# Patient Record
Sex: MALE | Race: WHITE
[De-identification: names, ages, dates, MRNs, and addresses within clinical notes are randomized per-mention and may not be internally consistent; named-entity substitution may affect disease eponyms.]

---

## 2018-11-14 ENCOUNTER — HOSPITAL ENCOUNTER (EMERGENCY)
Dept: HOSPITAL 14 - H.ER | Age: 73
Discharge: HOME | End: 2018-11-14
Payer: MEDICARE

## 2018-11-14 VITALS
DIASTOLIC BLOOD PRESSURE: 70 MMHG | SYSTOLIC BLOOD PRESSURE: 120 MMHG | HEART RATE: 70 BPM | TEMPERATURE: 98.3 F | OXYGEN SATURATION: 98 %

## 2018-11-14 VITALS — RESPIRATION RATE: 20 BRPM

## 2018-11-14 DIAGNOSIS — R10.9: Primary | ICD-10-CM

## 2018-11-14 DIAGNOSIS — K57.30: ICD-10-CM

## 2018-11-14 LAB
ALBUMIN SERPL-MCNC: 4.4 G/DL (ref 3.5–5)
ALBUMIN/GLOB SERPL: 1.2 {RATIO} (ref 1–2.1)
ALT SERPL-CCNC: 29 U/L (ref 21–72)
AST SERPL-CCNC: 30 U/L (ref 17–59)
BASOPHILS # BLD AUTO: 0 K/UL (ref 0–0.2)
BASOPHILS NFR BLD: 0.5 % (ref 0–2)
BILIRUB UR-MCNC: NEGATIVE MG/DL
BUN SERPL-MCNC: 20 MG/DL (ref 9–20)
CALCIUM SERPL-MCNC: 9.1 MG/DL (ref 8.4–10.2)
COLOR UR: YELLOW
EOSINOPHIL # BLD AUTO: 1.7 K/UL (ref 0–0.7)
EOSINOPHIL NFR BLD: 16.1 % (ref 0–4)
ERYTHROCYTE [DISTWIDTH] IN BLOOD BY AUTOMATED COUNT: 14 % (ref 11.5–14.5)
GFR NON-AFRICAN AMERICAN: > 60
GLUCOSE UR STRIP-MCNC: (no result) MG/DL
HGB BLD-MCNC: 16.2 G/DL (ref 12–18)
LEUKOCYTE ESTERASE UR-ACNC: (no result) LEU/UL
LYMPHOCYTES # BLD AUTO: 3.7 K/UL (ref 1–4.3)
LYMPHOCYTES NFR BLD AUTO: 34.9 % (ref 20–40)
MCH RBC QN AUTO: 32.9 PG (ref 27–31)
MCHC RBC AUTO-ENTMCNC: 33 G/DL (ref 33–37)
MCV RBC AUTO: 99.7 FL (ref 80–94)
MONOCYTES # BLD: 0.8 K/UL (ref 0–0.8)
MONOCYTES NFR BLD: 7.3 % (ref 0–10)
NEUTROPHILS # BLD: 4.4 K/UL (ref 1.8–7)
NEUTROPHILS NFR BLD AUTO: 41.2 % (ref 50–75)
NRBC BLD AUTO-RTO: 0.1 % (ref 0–0)
PH UR STRIP: 5 [PH] (ref 5–8)
PLATELET # BLD: 209 K/UL (ref 130–400)
PMV BLD AUTO: 9.3 FL (ref 7.2–11.7)
PROT UR STRIP-MCNC: NEGATIVE MG/DL
RBC # BLD AUTO: 4.94 MIL/UL (ref 4.4–5.9)
RBC # UR STRIP: NEGATIVE /UL
SP GR UR STRIP: 1.02 (ref 1–1.03)
SQUAMOUS EPITHIAL: < 1 /HPF (ref 0–5)
URINE CLARITY: CLEAR
UROBILINOGEN UR-MCNC: (no result) MG/DL (ref 0.2–1)
WBC # BLD AUTO: 10.7 K/UL (ref 4.8–10.8)

## 2018-11-14 PROCEDURE — 74177 CT ABD & PELVIS W/CONTRAST: CPT

## 2018-11-14 PROCEDURE — 87086 URINE CULTURE/COLONY COUNT: CPT

## 2018-11-14 PROCEDURE — 99283 EMERGENCY DEPT VISIT LOW MDM: CPT

## 2018-11-14 PROCEDURE — 85025 COMPLETE CBC W/AUTO DIFF WBC: CPT

## 2018-11-14 PROCEDURE — 81003 URINALYSIS AUTO W/O SCOPE: CPT

## 2018-11-14 PROCEDURE — 80053 COMPREHEN METABOLIC PANEL: CPT

## 2018-11-14 NOTE — CT
Date of service: 



11/14/2018



PROCEDURE:  CT Abdomen and Pelvis with contrast



HISTORY:

abdominal pain rule out r hernia



COMPARISON:

None available.



TECHNIQUE:

CT scan of the abdomen and pelvis was performed after administration 

of intravenous contrast. Oral contrast was not administered.  Coronal 

and sagittal reformatted images were obtained.



Contrast dose: 90 mL Omnipaque 300 



Radiation dose:



Total exam DLP = 379.54 mGy-cm.



This CT exam was performed using one or more of the following dose 

reduction techniques: Automated exposure control, adjustment of the 

mA and/or kV according to patient size, and/or use of iterative 

reconstruction technique.



FINDINGS:



LOWER THORAX:

There is linear atelectasis/scarring in the visualized lungs.  There 

is subsegmental atelectasis in the right lung base. 



LIVER:

Normal in size with homogeneous enhancement.  No gross lesion or 

ductal dilatation. 



GALLBLADDER AND BILE DUCTS:

Well distended.  No calcified gallstones, wall thickening or 

pericholecystic fluid. 



PANCREAS:

Normal in size with homogeneous enhancement.  No gross lesion or 

ductal dilatation.



SPLEEN:

Normal in size and appearance. 



ADRENALS:

No discrete nodule.  Mild thickening of the left adrenal gland. 



KIDNEYS AND URETERS:

Normal in size with homogeneous enhancement.  No hydronephrosis. No 

solid mass.  There are simple cortical cysts in the left kidney, the 

largest exophytic cyst in the upper pole measures 4.1 x 2.8 cm. 



VASCULATURE:

No aortic aneurysm.  Aortic atherosclerotic calcifications are 

present. 



BOWEL:

Evaluation of the bowel is limited in the absence of oral contrast.  

There is mild dilatation of small bowel loops.  There is mild mural 

thickening in the colonic wall.  There is scattered colonic 

diverticulosis without CT evidence for acute diverticulitis. 



APPENDIX:

Normal appendix. 



PERITONEUM:

No free fluid. No free air. 



LYMPH NODES:

No enlarged lymph nodes. 



BLADDER:

Well distended and normal in appearance. 



REPRODUCTIVE:

The prostate gland is normal in size. 



BONES:

No acute fracture.



There is diffuse bone demineralization and multilevel degenerative 

changes in the spine.



OTHER FINDINGS:

There is a small sliding hiatal her.



IMPRESSION:

Mild dilatation of small bowel loops and mild mural thickening in the 

colon may represent nonspecific acute infectious/inflammatory 

enterocolitis. 



No CT evidence for inguinal or ventral wall hernia. 



Scattered colonic diverticulosis without CT evidence for acute 

diverticulitis.

## 2018-11-14 NOTE — ED PDOC
HPI: Abdomen


Time Seen by Provider: 11/14/18 16:22


Chief Complaint (Nursing): Abdominal Pain


Chief Complaint (Provider): Abdominal Pain


History Per: Patient


History/Exam Limitations: no limitations


Onset/Duration Of Symptoms: Days (x3 months)


Outside of [place] travel?: No


Current Symptoms Are (Timing): Still Present


Additional Complaint(s): 


72 y/o male presents to the ED for evaluation of pain to the right groin, onset 

3 months ago. Patient reports of having had two hernia repairs in 2008 in that 

same area. Patient describes pain as burning and has been hurting for 

approximately 3 months. Patient is requesting an XR at this time for further e

valuation. Otherwise, patient denies fever and vomiting. [employer] Faroese 

 #[id number] used to obtain history.





PMD: [facility] 





Past Medical History


Reviewed: Historical Data, Nursing Documentation, Vital Signs


Vital Signs: 





                                Last Vital Signs











Temp  97.8 F   11/14/18 16:33


 


Pulse  69   11/14/18 16:33


 


Resp  20   11/14/18 16:33


 


BP  151/89 H  11/14/18 16:33


 


Pulse Ox  97   11/14/18 16:33














- Medical History


PMH: No Chronic Diseases





- Surgical History


Surgical History: Hernia Repair (x2 in 2008)





- Family History


Family History: States: Unknown Family Hx





- Home Medications


Home Medications: 


                                Ambulatory Orders











 Medication  Instructions  Recorded


 


Naproxen/Esomeprazole Mag [Vimovo 1 each PO BID PRN #30 tab.ir. 08/16/16





 375-20 mg Tablet]  














- Allergies


Allergies/Adverse Reactions: 


                                    Allergies











Allergy/AdvReac Type Severity Reaction Status Date / Time


 


No Known Allergies Allergy   Verified 08/16/16 15:15














Review of Systems


ROS Statement: Except As Marked, All Systems Reviewed And Found Negative


Constitutional: Negative for: Fever


Gastrointestinal: Negative for: Vomiting


Genitourinary Male: Positive for: Other (Right sided groin pain)





Physical Exam





- Reviewed


Nursing Documentation Reviewed: Yes


Vital Signs Reviewed: Yes





- Physical Exam


Appears: Positive for: No Acute Distress


Head Exam: Positive for: ATRAUMATIC, NORMOCEPHALIC


Skin: Positive for: Normal Color, Warm, Dry


Eye Exam: Positive for: Normal appearance, EOMI, PERRL


Neck: Positive for: Normal, Painless ROM


Cardiovascular/Chest: Positive for: Regular Rate, Rhythm.  Negative for: Murmur


Respiratory: Positive for: Normal Breath Sounds.  Negative for: Respiratory 

Distress


Gastrointestinal/Abdominal: Positive for: Normal Exam, Soft.  Negative for: 

Tenderness


Male Genital Exam: Positive for: other (Mild right sided inguinal tenderness).  

Negative for: hernia mass (no palpable mass)


Back: Positive for: Normal Inspection.  Negative for: L CVA Tenderness, R CVA 

Tenderness, Vertebral Tenderness


Extremity: Positive for: Normal ROM.  Negative for: Deformity


Neurologic/Psych: Positive for: Alert, Oriented.  Negative for: Motor/Sensory 

Deficits





- Laboratory Results


Result Diagrams: 


                                 11/14/18 17:04





                                 11/14/18 17:04





- ECG


O2 Sat by Pulse Oximetry: 97 (RA)


Pulse Ox Interpretation: Normal





Medical Decision Making


Medical Decision Making: 


Time: 1704


Plan:


-- CT Abd/Pelvis IV Contrast Only


-- CMP


-- CBC with Differentials


-- Urine C&S


-- Urinalysis 





Time: 1821


CT RESULTS


FINDINGS:





LOWER THORAX:


There is linear atelectasis/scarring in the visualized lungs.  There is 

subsegmental atelectasis in the right lung base. 





LIVER:


Normal in size with homogeneous enhancement.  No gross lesion or ductal 

dilatation. 





GALLBLADDER AND BILE DUCTS:


Well distended.  No calcified gallstones, wall thickening or pericholecystic 

fluid. 





PANCREAS:


Normal in size with homogeneous enhancement.  No gross lesion or ductal 

dilatation.





SPLEEN:


Normal in size and appearance. 





ADRENALS:


No discrete nodule.  Mild thickening of the left adrenal gland. 





KIDNEYS AND URETERS:


Normal in size with homogeneous enhancement.  No hydronephrosis. No solid mass. 

There are simple cortical cysts in the left kidney, the largest exophytic cyst 

in the upper pole measures 4.1 x 2.8 cm. 





VASCULATURE:


No aortic aneurysm.  Aortic atherosclerotic calcifications are present. 





BOWEL:


Evaluation of the bowel is limited in the absence of oral contrast.  There is 

mild dilatation of small bowel loops.  There is mild mural thickening in the 

colonic wall.  There is scattered colonic diverticulosis without CT evidence for

acute diverticulitis. 





APPENDIX:


Normal appendix. 





PERITONEUM:


No free fluid. No free air. 





LYMPH NODES:


No enlarged lymph nodes. 





BLADDER:


Well distended and normal in appearance. 





REPRODUCTIVE:


The prostate gland is normal in size. 





BONES:


No acute fracture.





There is diffuse bone demineralization and multilevel degenerative changes in 

the spine.





OTHER FINDINGS:


There is a small sliding hiatal her.





IMPRESSION:


Mild dilatation of small bowel loops and mild mural thickening in the colon may 

represent nonspecific acute infectious/inflammatory enterocolitis. 





No CT evidence for inguinal or ventral wall hernia. 





Scattered colonic diverticulosis without CT evidence for acute diverticulitis.





Time: 1839


-- On re-evaluation patient reports symptoms have improved. On exam, abdomen is 

soft and non-tender. Patient is stable for discharge home. 





______________________________________________________________________________


Scribe Attestation:


Documented by Wily Mccarthy, acting as a scribe for Gilmer Morris MD.





Provider Scribe Attestation:


All medical record entries made by the Scribe were at my direction and 

personally dictated by me. I have reviewed the chart and agree that the record 

accurately reflects my personal performance of the history, physical exam, 

medical decision making, and the department course for this patient. I have also

personally directed, reviewed, and agree with the discharge instructions and 

disposition.





Disposition





- Clinical Impression


Clinical Impression: 


 Abdominal pain








- Disposition


Referrals: 


[facility] [Outside]


Spencer Marino MD [Staff Provider] - 


Condition: IMPROVED


Additional Instructions: 


follow up with your primary doctor in 1-2 days  (in [place]) for reevaluation

as well as GI referral given


return to the ED with any worsening or concerning symptoms


Instructions:  Stomach Ache and Stomach Upset


Forms:  [employer] (English), [email or web address] (Faroese)


Print Language: Senegalese

## 2019-01-14 ENCOUNTER — HOSPITAL ENCOUNTER (EMERGENCY)
Dept: HOSPITAL 14 - H.ER | Age: 74
Discharge: HOME | End: 2019-01-14
Payer: MEDICARE

## 2019-01-14 VITALS
OXYGEN SATURATION: 95 % | DIASTOLIC BLOOD PRESSURE: 75 MMHG | SYSTOLIC BLOOD PRESSURE: 144 MMHG | HEART RATE: 62 BPM | RESPIRATION RATE: 16 BRPM | TEMPERATURE: 98.1 F

## 2019-01-14 VITALS — BODY MASS INDEX: 25 KG/M2

## 2019-01-14 DIAGNOSIS — J18.9: Primary | ICD-10-CM

## 2019-01-14 NOTE — RAD
Date of service: 



01/14/2019



HISTORY:

Cough 



COMPARISON:

11/14/2008



TECHNIQUE:

Chest PA and lateral



FINDINGS:



LINES AND TUBES:

None. 



LUNG AND PLEURA:

The lungs are well inflated.  There is confluent airspace disease in 

the right lower lobe and linear scarring in the left lower lobe.  No 

pleural effusion or pneumothorax.



HEART AND MEDIASTINUM:

The heart is not enlarged.  No aortic atherosclerotic calcifications 

present.  The hilar and mediastinal contours are within normal limits.



SKELETAL STRUCTURES:

The bony structures are within normal limits for the patient's age.



VISUALIZED UPPER ABDOMEN:

Normal.



OTHER FINDINGS:

None.



IMPRESSION:

Confluent airspace disease in the right lower lobe may represent 

pneumonia. 



Follow-up after medical management is recommended to ensure complete 

resolution.

## 2019-05-29 ENCOUNTER — HOSPITAL ENCOUNTER (INPATIENT)
Dept: HOSPITAL 31 - C.ER | Age: 74
LOS: 1 days | Discharge: LEFT BEFORE DISCHARGE | DRG: 192 | End: 2019-05-30
Attending: INTERNAL MEDICINE | Admitting: INTERNAL MEDICINE
Payer: MEDICAID

## 2019-05-29 VITALS — RESPIRATION RATE: 20 BRPM

## 2019-05-29 VITALS — BODY MASS INDEX: 25 KG/M2

## 2019-05-29 DIAGNOSIS — J44.1: Primary | ICD-10-CM

## 2019-05-29 DIAGNOSIS — F17.210: ICD-10-CM

## 2019-05-29 LAB
ALBUMIN SERPL-MCNC: 4.1 G/DL (ref 3.5–5)
ALBUMIN/GLOB SERPL: 1.2 {RATIO} (ref 1–2.1)
ALT SERPL-CCNC: 32 U/L (ref 21–72)
AST SERPL-CCNC: 37 U/L (ref 17–59)
BACTERIA #/AREA URNS HPF: (no result) /[HPF]
BASOPHILS # BLD AUTO: 0 K/UL (ref 0–0.2)
BASOPHILS NFR BLD: 0.4 % (ref 0–2)
BILIRUB UR-MCNC: NEGATIVE MG/DL
BUN SERPL-MCNC: 15 MG/DL (ref 9–20)
CALCIUM SERPL-MCNC: 9 MG/DL (ref 8.6–10.4)
EOSINOPHIL # BLD AUTO: 1.6 K/UL (ref 0–0.7)
EOSINOPHIL NFR BLD: 15.2 % (ref 0–4)
ERYTHROCYTE [DISTWIDTH] IN BLOOD BY AUTOMATED COUNT: 14.4 % (ref 11.5–14.5)
GFR NON-AFRICAN AMERICAN: > 60
GLUCOSE UR STRIP-MCNC: NORMAL MG/DL
HGB BLD-MCNC: 15.9 G/DL (ref 12–18)
LEUKOCYTE ESTERASE UR-ACNC: (no result) LEU/UL
LYMPHOCYTES # BLD AUTO: 2.2 K/UL (ref 1–4.3)
LYMPHOCYTES NFR BLD AUTO: 20.4 % (ref 20–40)
MCH RBC QN AUTO: 33 PG (ref 27–31)
MCHC RBC AUTO-ENTMCNC: 34 G/DL (ref 33–37)
MCV RBC AUTO: 97.1 FL (ref 80–94)
MONOCYTES # BLD: 0.8 K/UL (ref 0–0.8)
MONOCYTES NFR BLD: 6.9 % (ref 0–10)
NEUTROPHILS # BLD: 6.2 K/UL (ref 1.8–7)
NEUTROPHILS NFR BLD AUTO: 57.1 % (ref 50–75)
NRBC BLD AUTO-RTO: 0 % (ref 0–2)
PH UR STRIP: 6 [PH] (ref 5–8)
PLATELET # BLD: 215 K/UL (ref 130–400)
PMV BLD AUTO: 9.8 FL (ref 7.2–11.7)
PROT UR STRIP-MCNC: NEGATIVE MG/DL
RBC # BLD AUTO: 4.82 MIL/UL (ref 4.4–5.9)
RBC # UR STRIP: NEGATIVE /UL
SP GR UR STRIP: 1.01 (ref 1–1.03)
UROBILINOGEN UR-MCNC: NORMAL MG/DL (ref 0.2–1)
WBC # BLD AUTO: 10.8 K/UL (ref 4.8–10.8)

## 2019-05-29 RX ADMIN — WATER SCH MLS/HR: 1 INJECTION INTRAMUSCULAR; INTRAVENOUS; SUBCUTANEOUS at 21:14

## 2019-05-29 RX ADMIN — IPRATROPIUM BROMIDE AND ALBUTEROL SULFATE SCH ML: .5; 3 SOLUTION RESPIRATORY (INHALATION) at 12:31

## 2019-05-29 RX ADMIN — IPRATROPIUM BROMIDE AND ALBUTEROL SULFATE SCH ML: .5; 3 SOLUTION RESPIRATORY (INHALATION) at 13:25

## 2019-05-29 RX ADMIN — BUDESONIDE SCH MG: 0.5 SUSPENSION RESPIRATORY (INHALATION) at 19:43

## 2019-05-29 RX ADMIN — ARFORMOTEROL TARTRATE SCH MCG: 15 SOLUTION RESPIRATORY (INHALATION) at 19:42

## 2019-05-29 RX ADMIN — METHYLPREDNISOLONE SODIUM SUCCINATE SCH MG: 40 INJECTION, POWDER, FOR SOLUTION INTRAMUSCULAR; INTRAVENOUS at 21:14

## 2019-05-30 VITALS
SYSTOLIC BLOOD PRESSURE: 150 MMHG | OXYGEN SATURATION: 95 % | TEMPERATURE: 98.1 F | DIASTOLIC BLOOD PRESSURE: 81 MMHG | HEART RATE: 86 BPM

## 2019-05-30 LAB
ALBUMIN SERPL-MCNC: 3.6 G/DL (ref 3.5–5)
ALBUMIN/GLOB SERPL: 1.3 {RATIO} (ref 1–2.1)
ALT SERPL-CCNC: 22 U/L (ref 21–72)
AST SERPL-CCNC: 24 U/L (ref 17–59)
BASOPHILS # BLD AUTO: 0 K/UL (ref 0–0.2)
BASOPHILS NFR BLD: 0.1 % (ref 0–2)
BUN SERPL-MCNC: 18 MG/DL (ref 9–20)
CALCIUM SERPL-MCNC: 8.6 MG/DL (ref 8.6–10.4)
EOSINOPHIL # BLD AUTO: 0 K/UL (ref 0–0.7)
EOSINOPHIL NFR BLD: 0.1 % (ref 0–4)
ERYTHROCYTE [DISTWIDTH] IN BLOOD BY AUTOMATED COUNT: 14.1 % (ref 11.5–14.5)
GFR NON-AFRICAN AMERICAN: > 60
HGB BLD-MCNC: 14 G/DL (ref 12–18)
LYMPHOCYTE: 4 % (ref 20–40)
LYMPHOCYTES # BLD AUTO: 0.8 K/UL (ref 1–4.3)
LYMPHOCYTES NFR BLD AUTO: 4.3 % (ref 20–40)
MCH RBC QN AUTO: 33.2 PG (ref 27–31)
MCHC RBC AUTO-ENTMCNC: 34.2 G/DL (ref 33–37)
MCV RBC AUTO: 97 FL (ref 80–94)
MONOCYTE: 2 % (ref 0–10)
MONOCYTES # BLD: 0.6 K/UL (ref 0–0.8)
MONOCYTES NFR BLD: 3.2 % (ref 0–10)
NEUTROPHILS # BLD: 17.1 K/UL (ref 1.8–7)
NEUTROPHILS NFR BLD AUTO: 92.3 % (ref 50–75)
NEUTROPHILS NFR BLD AUTO: 94 % (ref 50–75)
NRBC BLD AUTO-RTO: 0 % (ref 0–2)
PLATELET # BLD EST: NORMAL 10*3/UL
PLATELET # BLD: 216 K/UL (ref 130–400)
PMV BLD AUTO: 9.7 FL (ref 7.2–11.7)
RBC # BLD AUTO: 4.21 MIL/UL (ref 4.4–5.9)
RBC MORPH BLD: NORMAL
TOTAL CELLS COUNTED BLD: 100
WBC # BLD AUTO: 18.6 K/UL (ref 4.8–10.8)

## 2019-05-30 RX ADMIN — METHYLPREDNISOLONE SODIUM SUCCINATE SCH MG: 40 INJECTION, POWDER, FOR SOLUTION INTRAMUSCULAR; INTRAVENOUS at 13:04

## 2019-05-30 RX ADMIN — METHYLPREDNISOLONE SODIUM SUCCINATE SCH MG: 40 INJECTION, POWDER, FOR SOLUTION INTRAMUSCULAR; INTRAVENOUS at 05:40

## 2019-05-30 RX ADMIN — BUDESONIDE SCH MG: 0.5 SUSPENSION RESPIRATORY (INHALATION) at 08:45

## 2019-05-30 RX ADMIN — ARFORMOTEROL TARTRATE SCH MCG: 15 SOLUTION RESPIRATORY (INHALATION) at 09:13

## 2019-05-30 RX ADMIN — WATER SCH MLS/HR: 1 INJECTION INTRAMUSCULAR; INTRAVENOUS; SUBCUTANEOUS at 05:37

## 2019-05-30 RX ADMIN — IPRATROPIUM BROMIDE AND ALBUTEROL SULFATE SCH ML: .5; 3 SOLUTION RESPIRATORY (INHALATION) at 08:45

## 2019-05-30 RX ADMIN — IPRATROPIUM BROMIDE AND ALBUTEROL SULFATE SCH: .5; 3 SOLUTION RESPIRATORY (INHALATION) at 14:53

## 2021-05-06 ENCOUNTER — NEW PATIENT (OUTPATIENT)
Dept: URBAN - METROPOLITAN AREA CLINIC 73 | Facility: CLINIC | Age: 76
End: 2021-05-06

## 2021-05-06 VITALS — HEIGHT: 60 IN

## 2021-05-06 DIAGNOSIS — H25.813: ICD-10-CM

## 2021-05-06 DIAGNOSIS — H35.3131: ICD-10-CM

## 2021-05-06 DIAGNOSIS — H43.812: ICD-10-CM

## 2021-05-06 DIAGNOSIS — H35.033: ICD-10-CM

## 2021-05-06 PROCEDURE — 92202 OPSCPY EXTND ON/MAC DRAW: CPT

## 2021-05-06 PROCEDURE — 92134 CPTRZ OPH DX IMG PST SGM RTA: CPT

## 2021-05-06 PROCEDURE — 99244 OFF/OP CNSLTJ NEW/EST MOD 40: CPT

## 2021-05-06 ASSESSMENT — TONOMETRY
OS_IOP_MMHG: 12
OD_IOP_MMHG: 12

## 2021-05-06 ASSESSMENT — VISUAL ACUITY
OD_CC: 20/25-1
OS_PH: 20/50-3
OS_CC: 20/80+2

## 2023-02-06 ENCOUNTER — CONSULT EP (OUTPATIENT)
Dept: URBAN - METROPOLITAN AREA CLINIC 73 | Facility: CLINIC | Age: 78
End: 2023-02-06

## 2023-02-06 DIAGNOSIS — H35.033: ICD-10-CM

## 2023-02-06 DIAGNOSIS — H43.393: ICD-10-CM

## 2023-02-06 DIAGNOSIS — H35.3131: ICD-10-CM

## 2023-02-06 PROCEDURE — 92014 COMPRE OPH EXAM EST PT 1/>: CPT

## 2023-02-06 PROCEDURE — 92202 OPSCPY EXTND ON/MAC DRAW: CPT

## 2023-02-06 PROCEDURE — 92134 CPTRZ OPH DX IMG PST SGM RTA: CPT

## 2023-02-06 ASSESSMENT — TONOMETRY
OS_IOP_MMHG: 10
OD_IOP_MMHG: 8

## 2023-02-06 ASSESSMENT — VISUAL ACUITY
OS_CC: 20/50+3
OD_CC: 20/25
